# Patient Record
Sex: FEMALE | URBAN - METROPOLITAN AREA
[De-identification: names, ages, dates, MRNs, and addresses within clinical notes are randomized per-mention and may not be internally consistent; named-entity substitution may affect disease eponyms.]

---

## 2022-04-15 ENCOUNTER — OFFICE VISIT (OUTPATIENT)
Dept: URBAN - METROPOLITAN AREA CLINIC 24 | Facility: CLINIC | Age: 82
End: 2022-04-15
Payer: MEDICARE

## 2022-04-15 DIAGNOSIS — H10.45 OTHER CHRONIC ALLERGIC CONJUNCTIVITIS: ICD-10-CM

## 2022-04-15 DIAGNOSIS — D31.32 BENIGN NEOPLASM OF LEFT CHOROID: Primary | ICD-10-CM

## 2022-04-15 DIAGNOSIS — H26.492 OTHER SECONDARY CATARACT, LEFT EYE: ICD-10-CM

## 2022-04-15 DIAGNOSIS — H31.011: ICD-10-CM

## 2022-04-15 PROCEDURE — 92004 COMPRE OPH EXAM NEW PT 1/>: CPT | Performed by: STUDENT IN AN ORGANIZED HEALTH CARE EDUCATION/TRAINING PROGRAM

## 2022-04-15 PROCEDURE — 92134 CPTRZ OPH DX IMG PST SGM RTA: CPT | Performed by: STUDENT IN AN ORGANIZED HEALTH CARE EDUCATION/TRAINING PROGRAM

## 2022-04-15 NOTE — IMPRESSION/PLAN
Impression: Benign neoplasm of left choroid: D31.32.
-- photodocumented today Plan: Faint flat choroidal nevus with overlying dursen; monitor

## 2022-04-15 NOTE — IMPRESSION/PLAN
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 Plan: Pt ed cause of c/o itching and irritation.  To start pataday qd/bid per package directions and increase art tears to qid+ prn which is likely accounting for pt's fluctuating vision

## 2022-04-15 NOTE — IMPRESSION/PLAN
Impression: Other secondary cataract, left eye: H26.492. Plan: Mild effect on vision; no treatment recommended at this time. 
Pt will monitor and seek care with visual changes

## 2022-04-15 NOTE — IMPRESSION/PLAN
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
-- pt denies any retinal surgery or oc trauma Plan: Pt ed limitations to vision, monitor and seek care with vision changes.